# Patient Record
(demographics unavailable — no encounter records)

---

## 2024-11-19 NOTE — REVIEW OF SYSTEMS
[As Noted in HPI] : as noted in HPI [Confused or Disoriented] : confusion [Difficulty Walking] : difficulty walking [Negative] : Integumentary

## 2024-11-19 NOTE — PHYSICAL EXAM
[General Appearance - Alert] : alert [General Appearance - In No Acute Distress] : in no acute distress [Impaired Insight] : insight and judgment were intact [Motor Tone] : muscle tone was normal in all four extremities [Motor Strength] : muscle strength was normal in all four extremities [Sclera] : the sclera and conjunctiva were normal [Full Visual Field] : full visual field [Outer Ear] : the ears and nose were normal in appearance [Neck Appearance] : the appearance of the neck was normal [] : no respiratory distress [Heart Rate And Rhythm] : heart rate was normal and rhythm regular [Skin Color & Pigmentation] : normal skin color and pigmentation

## 2024-11-20 NOTE — DATA REVIEWED
[de-identified] : MR ANGIO BRAIN ORDERED BY: DHRUV ROSARIO MR BRAIN ORDERED BY: DHRUV ROSARIO  PROCEDURE DATE: 11/07/2024  INTERPRETATION: Technique: MRI of the brain was performed utilizing sagittal and axial T1, axial T2, axial gradient echo, axial and coronal FLAIR and diffusion imaging.  Comparison is made to a prior MRA of intracranial circulation performed on 5/90/24.  Findings: There is limited evaluation due to artifact from the patient's coil embolization.The patient is status post coil embolization of left ICA aneurysm. There is heterogeneous signal seen within the left lateral aneurysm sac.  There are small patchy, ovoid and punctate foci of T2 and Flair signal hyperintensities predominantly within the periventricular white matter, unchanged. There is no evidence of mass-effect or midline shift. There is no evidence of intra or extra-axial fluid collection. There is enlargement the sulci, cisterns and ventricles consistent with mild cerebral volume loss. There is prominence of the lateral ventricles, unchanged from the prior study. There is no evidence of acute infarction. Evaluation of the intracranial vascular flow-voids appear within normal limits. Gradient echo images demonstrate mild scattered bilateral frontal parietal temporal occipital subarachnoid hemosiderin staining consistent with prior subarachnoid hemorrhage There is minimal right maxillary mucosal thickening. The remaining visualized paranasal sinuses and bilateral mastoid air cells are clear. Technique: MRA of the intracranial circulation was performed using 3-D time of flight technique.  Comparison is made to a prior MRA performed on 5/90/24.  Findings: Evaluation of the anterior circulation demonstrates the patient is status post coil embolization of a large left internal carotid artery aneurysm. There has been interval increase in flow related signal within the left aneurysm sac. There is normal course and caliber of the right distal internal carotid artery. There is a normal appearance to the bilateral anterior cerebral and right middle cerebral arteries. There is limited evaluation of the left MCA due to artifact. Evaluation the posterior circulation demonstrates interval appearance of severe narrowing of the left P1 segment of the PCA that may be artifactual. Otherwise there is normal course and caliber of the bilateral vertebral arteries, vertebrobasilar junction and basilar artery. The right PCA is also within normal limits.  IMPRESSION: MRI of the brain: Status post coil embolization of left ICA aneurysm. Ventriculomegaly, unchanged . Mild microvascular disease. No evidence of acute infarction.  MRA of the intracranial circulation: Status post coil embolization of left ICA aneurysm with interval increase flow related signal within the left aneurysm sac. Interval severe stenosis of the T1 segment of the left PCA versus artifact.

## 2024-11-20 NOTE — HISTORY OF PRESENT ILLNESS
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   Patient presented to [facility] ED 2/27/24 with worsening headache. He reportedly fell while playing ping pong 4 days prior and had worsening headache since then. He takes ASA 81mg (last dose yesterday) for cardio protection. PCP ordered MRI and MRA Brain, which revealed large Left Supraclinoid ICA aneurysm, so was sent to ED. CT Head/CTA Head/Neck in the ED showed diffuse SAH, IVH, and 2.1cm L Supraclinoid aneurysm. Patient endorsed mild headache only. Denied vision changes, nausea/vomiting, weakness, numbness. He was admitted to the NSICU and on 2/28/24 the aneurysm was treated with coil embolization by Dr. Loera. Patient's hospital course was complicated by CSW and Influenza A. He was stabilized and discharged to [facility] 2/20/24.  4/11/24 - first postop visit with Dr. Loera. Unable to walk; in wheelchair. Patient complaining of confusion and memory loss. Mentation is improving. He endorses occasional Right-sided headaches 7/10 pain; has chronic blurry vision. Complaining of abd pain and constipation. Plan is to repeat MRA Head in 3 months and return to clinic. Continue working with PT  5/21/24 - f/u appt with Dr. Loera. Feeling much improved/stronger than last visit, still requiring wheelchair. Confusion and memory loss, disoriented to year oriented to self, place, and situation. Peripheral blurry vision in both eyes improved but visual field not quite full. Once patient discharged from rehab, plan for appt with LUKAS Kunz for Angiogram with possible flow diverter stent in August 2024 6/03/24 - patient discharged to home from Acute Rehab. Received 2 additional weeks of physical therapy at home  8/01/24 - f/u appt with LUKAS Kunz. Patient endorses feeling much more energetic and closer to baseline since discharge to home. Still with confusion, memory loss and leg weakness. Left leg feels stronger than Right. Peripheral blurry vision improved.  Disoriented to year still, but oriented to self, place, and situation. Patient's balance was impaired on exam, able to slowly ambulate on his own. LUE weaker than other 3 extremities. Plan to continue PT and obtain new MRA Brain in 6 months for aneurysm and MRI to r/o Stroke or new areas of bleed.   TODAY patient states he is feeling good, son states memory and cog issues remain. Strength and sensation intact x4. Still uses rolling walker for ambulation.   Soc Hx: Former smoker (quit 10+ yrs ago), no EtOH, no family history of aneurysms  PCP: Dr. Chun PFEIFFER  [facility]. [address], [place], [place] [id number] [phone]  Endo: Dr. Yennifer Mckeon [address] [place] [address] [phone]

## 2024-11-20 NOTE — ASSESSMENT
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    I reviewed and explained the results of the most recent imaging MRI and MRA Head (11/07/24) with the patient, which demonstrates no stroke, coil embolization of left ICA aneurysm with interval increase flow related signal within the left aneurysm sac compatible with continued aneurysm regrowth. Given these findings, and the history of prior rupture of this aneurysm, i discussed potential management options including continued observation vs treating with flow diversion. I discussed risks and benefits of each approach. At the end of the conversation, the patient and his son stated they wanted to proceed with treatment.   PLAN: - Flow diverting stent placement 1/08/24 - start DAPT 12/23/24; meds sent to "[facility]" - 12/27 check accumetrcis - clearance clinic info given to patient  - return to Dr. Loera clinic to review   The patient was instructed that if they should immediately call 911 or go to the Emergency Department if they experience symptoms of severe thunderclap headache, syncope, unexplained nausea/vomiting, visual changes, seizure-like activity, new weakness or numbness of extremities.   I reviewed and answered all patient questions. Patient verbalizes agreement and understanding with plan of care. Patient verbalizes agreement and understanding with plan of care.  I, Dr. Loera, personally performed the evaluation and management (E/M) services for this established patient who presents today with (a) new problem(s)/exacerbation of (an) existing condition(s). That E/M includes conducting the examination, assessing all new/exacerbated conditions, and establishing a new plan of care. Today, SALVADOR Song, was here to observe my evaluation and management services for this new problem/exacerbated condition to be followed going forward.

## 2024-11-20 NOTE — DATA REVIEWED
[de-identified] : MR ANGIO BRAIN ORDERED BY: DHRUV ROSARIO MR BRAIN ORDERED BY: DHRUV ROSARIO  PROCEDURE DATE: 11/07/2024  INTERPRETATION: Technique: MRI of the brain was performed utilizing sagittal and axial T1, axial T2, axial gradient echo, axial and coronal FLAIR and diffusion imaging.  Comparison is made to a prior MRA of intracranial circulation performed on 5/90/24.  Findings: There is limited evaluation due to artifact from the patient's coil embolization.The patient is status post coil embolization of left ICA aneurysm. There is heterogeneous signal seen within the left lateral aneurysm sac.  There are small patchy, ovoid and punctate foci of T2 and Flair signal hyperintensities predominantly within the periventricular white matter, unchanged. There is no evidence of mass-effect or midline shift. There is no evidence of intra or extra-axial fluid collection. There is enlargement the sulci, cisterns and ventricles consistent with mild cerebral volume loss. There is prominence of the lateral ventricles, unchanged from the prior study. There is no evidence of acute infarction. Evaluation of the intracranial vascular flow-voids appear within normal limits. Gradient echo images demonstrate mild scattered bilateral frontal parietal temporal occipital subarachnoid hemosiderin staining consistent with prior subarachnoid hemorrhage There is minimal right maxillary mucosal thickening. The remaining visualized paranasal sinuses and bilateral mastoid air cells are clear. Technique: MRA of the intracranial circulation was performed using 3-D time of flight technique.  Comparison is made to a prior MRA performed on 5/90/24.  Findings: Evaluation of the anterior circulation demonstrates the patient is status post coil embolization of a large left internal carotid artery aneurysm. There has been interval increase in flow related signal within the left aneurysm sac. There is normal course and caliber of the right distal internal carotid artery. There is a normal appearance to the bilateral anterior cerebral and right middle cerebral arteries. There is limited evaluation of the left MCA due to artifact. Evaluation the posterior circulation demonstrates interval appearance of severe narrowing of the left P1 segment of the PCA that may be artifactual. Otherwise there is normal course and caliber of the bilateral vertebral arteries, vertebrobasilar junction and basilar artery. The right PCA is also within normal limits.  IMPRESSION: MRI of the brain: Status post coil embolization of left ICA aneurysm. Ventriculomegaly, unchanged . Mild microvascular disease. No evidence of acute infarction.  MRA of the intracranial circulation: Status post coil embolization of left ICA aneurysm with interval increase flow related signal within the left aneurysm sac. Interval severe stenosis of the T1 segment of the left PCA versus artifact.

## 2024-11-20 NOTE — ASSESSMENT
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    I reviewed and explained the results of the most recent imaging MRI and MRA Head (11/07/24) with the patient, which demonstrates no stroke, coil embolization of left ICA aneurysm with interval increase flow related signal within the left aneurysm sac compatible with continued aneurysm regrowth. Given these findings, and the history of prior rupture of this aneurysm, i discussed potential management options including continued observation vs treating with flow diversion. I discussed risks and benefits of each approach. At the end of the conversation, the patient and his son stated they wanted to proceed with treatment.   PLAN: - Flow diverting stent placement 1/08/24 - start DAPT 12/23/24; meds sent to [facility] - 12/27 check accumetrcis - clearance clinic info given to patient  - return to Dr. Loera clinic to review   The patient was instructed that if they should immediately call 911 or go to the Emergency Department if they experience symptoms of severe thunderclap headache, syncope, unexplained nausea/vomiting, visual changes, seizure-like activity, new weakness or numbness of extremities.   I reviewed and answered all patient questions. Patient verbalizes agreement and understanding with plan of care. Patient verbalizes agreement and understanding with plan of care.  I, Dr. Loera, personally performed the evaluation and management (E/M) services for this established patient who presents today with (a) new problem(s)/exacerbation of (an) existing condition(s). That E/M includes conducting the examination, assessing all new/exacerbated conditions, and establishing a new plan of care. Today, SALVADOR Song, was here to observe my evaluation and management services for this new problem/exacerbated condition to be followed going forward.

## 2024-11-20 NOTE — REASON FOR VISIT
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

## 2024-11-20 NOTE — HISTORY OF PRESENT ILLNESS
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   Patient presented to [facility] ED 2/27/24 with worsening headache. He reportedly fell while playing ping pong 4 days prior and had worsening headache since then. He takes ASA 81mg (last dose yesterday) for cardio protection. PCP ordered MRI and MRA Brain, which revealed large Left Supraclinoid ICA aneurysm, so was sent to ED. CT Head/CTA Head/Neck in the ED showed diffuse SAH, IVH, and 2.1cm L Supraclinoid aneurysm. Patient endorsed mild headache only. Denied vision changes, nausea/vomiting, weakness, numbness. He was admitted to the NSICU and on 2/28/24 the aneurysm was treated with coil embolization by Dr. Loera. Patient's hospital course was complicated by CSW and Influenza A. He was stabilized and discharged to [facility] 2/20/24.  4/11/24 - first postop visit with Dr. Leora. Unable to walk; in wheelchair. Patient complaining of confusion and memory loss. Mentation is improving. He endorses occasional Right-sided headaches 7/10 pain; has chronic blurry vision. Complaining of abd pain and constipation. Plan is to repeat MRA Head in 3 months and return to clinic. Continue working with PT  5/21/24 - f/u appt with Dr. Loera. Feeling much improved/stronger than last visit, still requiring wheelchair. Confusion and memory loss, disoriented to year oriented to self, place, and situation. Peripheral blurry vision in both eyes improved but visual field not quite full. Once patient discharged from rehab, plan for appt with LUKAS Kunz for Angiogram with possible flow diverter stent in August 2024 6/03/24 - patient discharged to home from Acute Rehab. Received 2 additional weeks of physical therapy at home  8/01/24 - f/u appt with LUKAS Kunz. Patient endorses feeling much more energetic and closer to baseline since discharge to home. Still with confusion, memory loss and leg weakness. Left leg feels stronger than Right. Peripheral blurry vision improved.  Disoriented to year still, but oriented to self, place, and situation. Patient's balance was impaired on exam, able to slowly ambulate on his own. LUE weaker than other 3 extremities. Plan to continue PT and obtain new MRA Brain in 6 months for aneurysm and MRI to r/o Stroke or new areas of bleed.   TODAY patient states he is feeling good, son states memory and cog issues remain. Strength and sensation intact x4. Still uses rolling walker for ambulation.   Soc Hx: Former smoker (quit 10+ yrs ago), no EtOH, no family history of aneurysms  PCP: Dr. Chun PFEIFFER  [facility]. [address], [place], [place] [id number] [phone]  Endo: Dr. Yennifer Mckeon [address] [place] [address] [phone]

## 2024-11-20 NOTE — ASSESSMENT
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    I reviewed and explained the results of the most recent imaging MRI and MRA Head (11/07/24) with the patient, which demonstrates no stroke, coil embolization of left ICA aneurysm with interval increase flow related signal within the left aneurysm sac compatible with continued aneurysm regrowth. Given these findings, and the history of prior rupture of this aneurysm, i discussed potential management options including continued observation vs treating with flow diversion. I discussed risks and benefits of each approach. At the end of the conversation, the patient and his son stated they wanted to proceed with treatment.   PLAN: - Flow diverting stent placement 1/08/24 - start DAPT 12/23/24; meds sent to [employer] - 12/27 check accumetrcis - clearance clinic info given to patient  - return to Dr. Loera clinic to review   The patient was instructed that if they should immediately call 911 or go to the Emergency Department if they experience symptoms of severe thunderclap headache, syncope, unexplained nausea/vomiting, visual changes, seizure-like activity, new weakness or numbness of extremities.   I reviewed and answered all patient questions. Patient verbalizes agreement and understanding with plan of care. Patient verbalizes agreement and understanding with plan of care.  I, Dr. Loera, personally performed the evaluation and management (E/M) services for this established patient who presents today with (a) new problem(s)/exacerbation of (an) existing condition(s). That E/M includes conducting the examination, assessing all new/exacerbated conditions, and establishing a new plan of care. Today, SALVADOR Song, was here to observe my evaluation and management services for this new problem/exacerbated condition to be followed going forward.

## 2024-11-20 NOTE — DATA REVIEWED
[de-identified] : MR ANGIO BRAIN ORDERED BY: DHRUV ROSARIO MR BRAIN ORDERED BY: DHRUV ROSARIO  PROCEDURE DATE: 11/07/2024  INTERPRETATION: Technique: MRI of the brain was performed utilizing sagittal and axial T1, axial T2, axial gradient echo, axial and coronal FLAIR and diffusion imaging.  Comparison is made to a prior MRA of intracranial circulation performed on 5/90/24.  Findings: There is limited evaluation due to artifact from the patient's coil embolization.The patient is status post coil embolization of left ICA aneurysm. There is heterogeneous signal seen within the left lateral aneurysm sac.  There are small patchy, ovoid and punctate foci of T2 and Flair signal hyperintensities predominantly within the periventricular white matter, unchanged. There is no evidence of mass-effect or midline shift. There is no evidence of intra or extra-axial fluid collection. There is enlargement the sulci, cisterns and ventricles consistent with mild cerebral volume loss. There is prominence of the lateral ventricles, unchanged from the prior study. There is no evidence of acute infarction. Evaluation of the intracranial vascular flow-voids appear within normal limits. Gradient echo images demonstrate mild scattered bilateral frontal parietal temporal occipital subarachnoid hemosiderin staining consistent with prior subarachnoid hemorrhage There is minimal right maxillary mucosal thickening. The remaining visualized paranasal sinuses and bilateral mastoid air cells are clear. Technique: MRA of the intracranial circulation was performed using 3-D time of flight technique.  Comparison is made to a prior MRA performed on 5/90/24.  Findings: Evaluation of the anterior circulation demonstrates the patient is status post coil embolization of a large left internal carotid artery aneurysm. There has been interval increase in flow related signal within the left aneurysm sac. There is normal course and caliber of the right distal internal carotid artery. There is a normal appearance to the bilateral anterior cerebral and right middle cerebral arteries. There is limited evaluation of the left MCA due to artifact. Evaluation the posterior circulation demonstrates interval appearance of severe narrowing of the left P1 segment of the PCA that may be artifactual. Otherwise there is normal course and caliber of the bilateral vertebral arteries, vertebrobasilar junction and basilar artery. The right PCA is also within normal limits.  IMPRESSION: MRI of the brain: Status post coil embolization of left ICA aneurysm. Ventriculomegaly, unchanged . Mild microvascular disease. No evidence of acute infarction.  MRA of the intracranial circulation: Status post coil embolization of left ICA aneurysm with interval increase flow related signal within the left aneurysm sac. Interval severe stenosis of the T1 segment of the left PCA versus artifact.

## 2024-11-20 NOTE — ASSESSMENT
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    I reviewed and explained the results of the most recent imaging MRI and MRA Head (11/07/24) with the patient, which demonstrates no stroke, coil embolization of left ICA aneurysm with interval increase flow related signal within the left aneurysm sac compatible with continued aneurysm regrowth. Given these findings, and the history of prior rupture of this aneurysm, i discussed potential management options including continued observation vs treating with flow diversion. I discussed risks and benefits of each approach. At the end of the conversation, the patient and his son stated they wanted to proceed with treatment.   PLAN: - Flow diverting stent placement 1/08/24 - start DAPT 12/23/24; meds sent to [facility] - 12/27 check accumetrcis - clearance clinic info given to patient  - return to Dr. Loera clinic to review   The patient was instructed that if they should immediately call 911 or go to the Emergency Department if they experience symptoms of severe thunderclap headache, syncope, unexplained nausea/vomiting, visual changes, seizure-like activity, new weakness or numbness of extremities.   I reviewed and answered all patient questions. Patient verbalizes agreement and understanding with plan of care. Patient verbalizes agreement and understanding with plan of care.  I, Dr. Loera, personally performed the evaluation and management (E/M) services for this established patient who presents today with (a) new problem(s)/exacerbation of (an) existing condition(s). That E/M includes conducting the examination, assessing all new/exacerbated conditions, and establishing a new plan of care. Today, SALVADOR Song, was here to observe my evaluation and management services for this new problem/exacerbated condition to be followed going forward.

## 2024-11-20 NOTE — HISTORY OF PRESENT ILLNESS
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   Patient presented to [facility] ED 2/27/24 with worsening headache. He reportedly fell while playing ping pong 4 days prior and had worsening headache since then. He takes ASA 81mg (last dose yesterday) for cardio protection. PCP ordered MRI and MRA Brain, which revealed large Left Supraclinoid ICA aneurysm, so was sent to ED. CT Head/CTA Head/Neck in the ED showed diffuse SAH, IVH, and 2.1cm L Supraclinoid aneurysm. Patient endorsed mild headache only. Denied vision changes, nausea/vomiting, weakness, numbness. He was admitted to the NSICU and on 2/28/24 the aneurysm was treated with coil embolization by Dr. Loera. Patient's hospital course was complicated by CSW and Influenza A. He was stabilized and discharged to [facility] 2/20/24.  4/11/24 - first postop visit with Dr. Loera. Unable to walk; in wheelchair. Patient complaining of confusion and memory loss. Mentation is improving. He endorses occasional Right-sided headaches 7/10 pain; has chronic blurry vision. Complaining of abd pain and constipation. Plan is to repeat MRA Head in 3 months and return to clinic. Continue working with PT  5/21/24 - f/u appt with Dr. Loera. Feeling much improved/stronger than last visit, still requiring wheelchair. Confusion and memory loss, disoriented to year oriented to self, place, and situation. Peripheral blurry vision in both eyes improved but visual field not quite full. Once patient discharged from rehab, plan for appt with LUKAS Kunz for Angiogram with possible flow diverter stent in August 2024 6/03/24 - patient discharged to home from Acute Rehab. Received 2 additional weeks of physical therapy at home  8/01/24 - f/u appt with LUKAS Kunz. Patient endorses feeling much more energetic and closer to baseline since discharge to home. Still with confusion, memory loss and leg weakness. Left leg feels stronger than Right. Peripheral blurry vision improved.  Disoriented to year still, but oriented to self, place, and situation. Patient's balance was impaired on exam, able to slowly ambulate on his own. LUE weaker than other 3 extremities. Plan to continue PT and obtain new MRA Brain in 6 months for aneurysm and MRI to r/o Stroke or new areas of bleed.   TODAY patient states he is feeling good, son states memory and cog issues remain. Strength and sensation intact x4. Still uses rolling walker for ambulation.   Soc Hx: Former smoker (quit 10+ yrs ago), no EtOH, no family history of aneurysms  PCP: Dr. Chnu PFEIFFER  [facility]. [address], [place], [place] [id number] [phone]  Endo: Dr. Yennifer Mckeon [address] [place] [address] [phone]

## 2024-11-20 NOTE — HISTORY OF PRESENT ILLNESS
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   Patient presented to [facility] ED 2/27/24 with worsening headache. He reportedly fell while playing ping pong 4 days prior and had worsening headache since then. He takes ASA 81mg (last dose yesterday) for cardio protection. PCP ordered MRI and MRA Brain, which revealed large Left Supraclinoid ICA aneurysm, so was sent to ED. CT Head/CTA Head/Neck in the ED showed diffuse SAH, IVH, and 2.1cm L Supraclinoid aneurysm. Patient endorsed mild headache only. Denied vision changes, nausea/vomiting, weakness, numbness. He was admitted to the NSICU and on 2/28/24 the aneurysm was treated with coil embolization by Dr. Loera. Patient's hospital course was complicated by CSW and Influenza A. He was stabilized and discharged to [facility] Acute [facility]hab 2/20/24.  4/11/24 - first postop visit with Dr. Loera. Unable to walk; in wheelchair. Patient complaining of confusion and memory loss. Mentation is improving. He endorses occasional Right-sided headaches 7/10 pain; has chronic blurry vision. Complaining of abd pain and constipation. Plan is to repeat MRA Head in 3 months and return to clinic. Continue working with PT  5/21/24 - f/u appt with Dr. Loera. Feeling much improved/stronger than last visit, still requiring wheelchair. Confusion and memory loss, disoriented to year oriented to self, place, and situation. Peripheral blurry vision in both eyes improved but visual field not quite full. Once patient discharged from rehab, plan for appt with LUKAS Kunz for Angiogram with possible flow diverter stent in August 2024 6/03/24 - patient discharged to home from Acute Rehab. Received 2 additional weeks of physical therapy at home  8/01/24 - f/u appt with LUKAS Kunz. Patient endorses feeling much more energetic and closer to baseline since discharge to home. Still with confusion, memory loss and leg weakness. Left leg feels stronger than Right. Peripheral blurry vision improved.  Disoriented to year still, but oriented to self, place, and situation. Patient's balance was impaired on exam, able to slowly ambulate on his own. LUE weaker than other 3 extremities. Plan to continue PT and obtain new MRA Brain in 6 months for aneurysm and MRI to r/o Stroke or new areas of bleed.   TODAY patient states he is feeling good, son states memory and cog issues remain. Strength and sensation intact x4. Still uses rolling walker for ambulation.   Soc Hx: Former smoker (quit 10+ yrs ago), no EtOH, no family history of aneurysms  PCP: Dr. Chun PFEIFFER  [facility]. [id number], [place], [place] [id number] [phone]  Endo: Dr. Yennifer Mckeon [address] [place] [address] [phone]

## 2024-11-20 NOTE — DATA REVIEWED
[de-identified] : MR ANGIO BRAIN ORDERED BY: DHRUV ROASRIO MR BRAIN ORDERED BY: DHRUV ROSARIO  PROCEDURE DATE: 11/07/2024  INTERPRETATION: Technique: MRI of the brain was performed utilizing sagittal and axial T1, axial T2, axial gradient echo, axial and coronal FLAIR and diffusion imaging.  Comparison is made to a prior MRA of intracranial circulation performed on 5/90/24.  Findings: There is limited evaluation due to artifact from the patient's coil embolization.The patient is status post coil embolization of left ICA aneurysm. There is heterogeneous signal seen within the left lateral aneurysm sac.  There are small patchy, ovoid and punctate foci of T2 and Flair signal hyperintensities predominantly within the periventricular white matter, unchanged. There is no evidence of mass-effect or midline shift. There is no evidence of intra or extra-axial fluid collection. There is enlargement the sulci, cisterns and ventricles consistent with mild cerebral volume loss. There is prominence of the lateral ventricles, unchanged from the prior study. There is no evidence of acute infarction. Evaluation of the intracranial vascular flow-voids appear within normal limits. Gradient echo images demonstrate mild scattered bilateral frontal parietal temporal occipital subarachnoid hemosiderin staining consistent with prior subarachnoid hemorrhage There is minimal right maxillary mucosal thickening. The remaining visualized paranasal sinuses and bilateral mastoid air cells are clear. Technique: MRA of the intracranial circulation was performed using 3-D time of flight technique.  Comparison is made to a prior MRA performed on 5/90/24.  Findings: Evaluation of the anterior circulation demonstrates the patient is status post coil embolization of a large left internal carotid artery aneurysm. There has been interval increase in flow related signal within the left aneurysm sac. There is normal course and caliber of the right distal internal carotid artery. There is a normal appearance to the bilateral anterior cerebral and right middle cerebral arteries. There is limited evaluation of the left MCA due to artifact. Evaluation the posterior circulation demonstrates interval appearance of severe narrowing of the left P1 segment of the PCA that may be artifactual. Otherwise there is normal course and caliber of the bilateral vertebral arteries, vertebrobasilar junction and basilar artery. The right PCA is also within normal limits.  IMPRESSION: MRI of the brain: Status post coil embolization of left ICA aneurysm. Ventriculomegaly, unchanged . Mild microvascular disease. No evidence of acute infarction.  MRA of the intracranial circulation: Status post coil embolization of left ICA aneurysm with interval increase flow related signal within the left aneurysm sac. Interval severe stenosis of the T1 segment of the left PCA versus artifact.

## 2025-01-02 NOTE — HISTORY OF PRESENT ILLNESS
[No Pertinent Cardiac History] : no history of aortic stenosis, atrial fibrillation, coronary artery disease, recent myocardial infarction, or implantable device/pacemaker [No Pertinent Pulmonary History] : no history of asthma, COPD, sleep apnea, or smoking [No Adverse Anesthesia Reaction] : no adverse anesthesia reaction in self or family member [Diabetes] : diabetes [(Patient denies any chest pain, claudication, dyspnea on exertion, orthopnea, palpitations or syncope)] : Patient denies any chest pain, claudication, dyspnea on exertion, orthopnea, palpitations or syncope [Moderate (4-6 METs)] : Moderate (4-6 METs) [Family Member] : family member [Chronic Anticoagulation] : no chronic anticoagulation [Chronic Kidney Disease] : no chronic kidney disease [FreeTextEntry1] : Flow diverting stent placement  [FreeTextEntry2] : 1/8/24 [FreeTextEntry3] : Dr. Loera [FreeTextEntry4] : Mr. 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 Biref clinical course, patient had a fall on 02/2024 w/ worsening HA, had an MRI and MRA brain which showed a large left supraclinoid ICA aneurysm and s/p coil embolization. Discharged to rehab w/ NSGY follow. Patient's recovery overall well. Had a routine MRI and MRA brain which showed enlarging aneurysm and is not planned for diverting stent on 1/8/24. Does report baseline HA 5/10 but ROS otherwise negative. Feels well otherwise.   Saw endocrinologist, last A1c was 10% ~1month ago. Reports endocrinogist is okay with patient having this procedure. Patient has a note by his endocrinologist with recommendations prior to surgery    Meds: Basaglar 24u qhs, Metformin 850mg BID, Glipizide 10mg qd, Actos 30mg qd, Januvia 100mg qd, Jardiance 25mg qd, Prandin 2mg qd, Atorvastatin 10mg qd, plavix 75mg, and ASA 81mg  NKDA Surg: Coil embolization 2/28/24 FamHx: Father and mother hx of DM SocHx: Ex-smoker quit 10yrs (smoked 1 ppd for 8yrs, quit >10ys ago), No alcohol use, No recreational drug use, Returied (worked in clothing). [FreeTextEntry7] : TTE 2/28/2024 CONCLUSIONS: 1. Normal left and right ventricular size and systolic function. 2. Mild symmetric left ventricular hypertrophy. 3. Mild mitral regurgitation. 4. No evidence of pulmonary hypertension. 5. No pericardial effusion. [FreeTextEntry8] : METS hard to fully access but patient walks with a walker outside. Does walk ~10mins at home before having leg pain but no exertional CP or exertional SOB. has a bike pedal at home which he bikes 5mins 2-3x a day. Able to feed and clean himself. Has a HHA which helps w/ household chores for 6hrs aday.

## 2025-01-02 NOTE — ADDENDUM
[FreeTextEntry1] : labs notable for known uncontrolled diabetes and borderline prolonged PTT. Mixing study added. Not a contraindication to low risk procedure (angiogram with stent placement).

## 2025-01-02 NOTE — REVIEW OF SYSTEMS
[Negative] : Musculoskeletal [Fever] : no fever [Chills] : no chills [Fatigue] : no fatigue [Night Sweats] : no night sweats [Discharge] : no discharge [Pain] : no pain [Vision Problems] : no vision problems [Nasal Discharge] : no nasal discharge [Sore Throat] : no sore throat [Hoarseness] : no hoarseness [Skin Rash] : no skin rash [de-identified] : +HA

## 2025-01-02 NOTE — HISTORY OF PRESENT ILLNESS
[No Pertinent Cardiac History] : no history of aortic stenosis, atrial fibrillation, coronary artery disease, recent myocardial infarction, or implantable device/pacemaker [No Pertinent Pulmonary History] : no history of asthma, COPD, sleep apnea, or smoking [No Adverse Anesthesia Reaction] : no adverse anesthesia reaction in self or family member [Diabetes] : diabetes [(Patient denies any chest pain, claudication, dyspnea on exertion, orthopnea, palpitations or syncope)] : Patient denies any chest pain, claudication, dyspnea on exertion, orthopnea, palpitations or syncope [Moderate (4-6 METs)] : Moderate (4-6 METs) [Family Member] : family member [Chronic Anticoagulation] : no chronic anticoagulation [Chronic Kidney Disease] : no chronic kidney disease [FreeTextEntry1] : Flow diverting stent placement  [FreeTextEntry2] : 1/8/24 [FreeTextEntry3] : Dr. Loera [FreeTextEntry4] : Mr. RONEL VALERA is a 78 year old male with history of HTN, HLD, DM, SAH/IVH 2/2 ruptured Left ICA aneurysm s/p coil embolization presenting today to the [facility] prior to flow diverting stent placement. Biref clinical course, patient had a fall on 02/2024 w/ worsening HA, had an MRI and MRA brain which showed a large left supraclinoid ICA aneurysm and s/p coil embolization. Discharged to rehab w/ NSGY follow. Patient's recovery overall well. Had a routine MRI and MRA brain which showed enlarging aneurysm and is not planned for diverting stent on 1/8/24. Does report baseline HA 5/10 but ROS otherwise negative. Feels well otherwise.   Saw endocrinologist, last A1c was 10% ~1month ago. Reports endocrinogist is okay with patient having this procedure. Patient has a note by his endocrinologist with recommendations prior to surgery    Meds: Basaglar 24u qhs, Metformin 850mg BID, Glipizide 10mg qd, Actos 30mg qd, Januvia 100mg qd, Jardiance 25mg qd, Prandin 2mg qd, Atorvastatin 10mg qd, plavix 75mg, and ASA 81mg  NKDA Surg: Coil embolization 2/28/24 FamHx: Father and mother hx of DM SocHx: Ex-smoker quit 10yrs (smoked 1 ppd for 8yrs, quit >10ys ago), No alcohol use, No recreational drug use, Returied (worked in clothing). [FreeTextEntry7] : TTE 2/28/2024 CONCLUSIONS: 1. Normal left and right ventricular size and systolic function. 2. Mild symmetric left ventricular hypertrophy. 3. Mild mitral regurgitation. 4. No evidence of pulmonary hypertension. 5. No pericardial effusion. [FreeTextEntry8] : METS hard to fully access but patient walks with a walker outside. Does walk ~10mins at home before having leg pain but no exertional CP or exertional SOB. has a bike pedal at home which he bikes 5mins 2-3x a day. Able to feed and clean himself. Has a HHA which helps w/ household chores for 6hrs aday.

## 2025-01-02 NOTE — REVIEW OF SYSTEMS
[Negative] : Musculoskeletal [Fever] : no fever [Chills] : no chills [Fatigue] : no fatigue [Night Sweats] : no night sweats [Discharge] : no discharge [Pain] : no pain [Vision Problems] : no vision problems [Nasal Discharge] : no nasal discharge [Sore Throat] : no sore throat [Hoarseness] : no hoarseness [Skin Rash] : no skin rash [de-identified] : +HA

## 2025-01-02 NOTE — HISTORY OF PRESENT ILLNESS
[No Pertinent Cardiac History] : no history of aortic stenosis, atrial fibrillation, coronary artery disease, recent myocardial infarction, or implantable device/pacemaker [No Pertinent Pulmonary History] : no history of asthma, COPD, sleep apnea, or smoking [No Adverse Anesthesia Reaction] : no adverse anesthesia reaction in self or family member [Diabetes] : diabetes [(Patient denies any chest pain, claudication, dyspnea on exertion, orthopnea, palpitations or syncope)] : Patient denies any chest pain, claudication, dyspnea on exertion, orthopnea, palpitations or syncope [Moderate (4-6 METs)] : Moderate (4-6 METs) [Family Member] : family member [Chronic Anticoagulation] : no chronic anticoagulation [Chronic Kidney Disease] : no chronic kidney disease [FreeTextEntry1] : Flow diverting stent placement  [FreeTextEntry2] : 1/8/24 [FreeTextEntry3] : Dr. Loera [FreeTextEntry4] : Mr. RONEL VALERA is a 78 year old male with history of HTN, HLD, DM, SAH/IVH 2/2 ruptured Left ICA aneurysm s/p coil embolization presenting today to the [facility] prior to flow diverting stent placement. Biref clinical course, patient had a fall on 02/2024 w/ worsening HA, had an MRI and MRA brain which showed a large left supraclinoid ICA aneurysm and s/p coil embolization. Discharged to rehab w/ NSGY follow. Patient's recovery overall well. Had a routine MRI and MRA brain which showed enlarging aneurysm and is not planned for diverting stent on 1/8/24. Does report baseline HA 5/10 but ROS otherwise negative. Feels well otherwise.   Saw endocrinologist, last A1c was 10% ~1month ago. Reports endocrinogist is okay with patient having this procedure. Patient has a note by his endocrinologist with recommendations prior to surgery    Meds: Basaglar 24u qhs, Metformin 850mg BID, Glipizide 10mg qd, Actos 30mg qd, Januvia 100mg qd, Jardiance 25mg qd, Prandin 2mg qd, Atorvastatin 10mg qd, plavix 75mg, and ASA 81mg  NKDA Surg: Coil embolization 2/28/24 FamHx: Father and mother hx of DM SocHx: Ex-smoker quit 10yrs (smoked 1 ppd for 8yrs, quit >10ys ago), No alcohol use, No recreational drug use, Returied (worked in clothing). [FreeTextEntry7] : TTE 2/28/2024 CONCLUSIONS: 1. Normal left and right ventricular size and systolic function. 2. Mild symmetric left ventricular hypertrophy. 3. Mild mitral regurgitation. 4. No evidence of pulmonary hypertension. 5. No pericardial effusion. [FreeTextEntry8] : METS hard to fully access but patient walks with a walker outside. Does walk ~10mins at home before having leg pain but no exertional CP or exertional SOB. has a bike pedal at home which he bikes 5mins 2-3x a day. Able to feed and clean himself. Has a HHA which helps w/ household chores for 6hrs aday.

## 2025-01-02 NOTE — PHYSICAL EXAM
[Normal Sclera/Conjunctiva] : normal sclera/conjunctiva [PERRL] : pupils equal round and reactive to light [EOMI] : extraocular movements intact [No Lymphadenopathy] : no lymphadenopathy [Supple] : supple [No Varicosities] : no varicosities [No Edema] : there was no peripheral edema [Soft] : abdomen soft [Non Tender] : non-tender [Normal Bowel Sounds] : normal bowel sounds [No CVA Tenderness] : no CVA  tenderness [No Spinal Tenderness] : no spinal tenderness [Normal] : normal gait, coordination grossly intact, no focal deficits and deep tendon reflexes were 2+ and symmetric [de-identified] : Abd distended.

## 2025-01-02 NOTE — END OF VISIT
[] : Resident [FreeTextEntry3] : Patients declines  and requests his son translate. Uncontrolled T2DM but would not delay time sensitive procedure for further optimization. Pre-op medication management discussed in detail with son and patient. They have written directions from neurosurgery. No further cardiac testing need for low-risk procedure, no anginal symptoms, ECG without ischemic changes. Intermediate risk for low risk procedure pending labs.

## 2025-01-02 NOTE — PHYSICAL EXAM
Detail Level: Detailed Detail Level: Generalized Patient Specific Counseling (Will Not Stick From Patient To Patient): Less then one percent BSA, Patient can not take methotrexate due to being a social drinker. Discussed in detail RBO’s of oral medications, steroids topicals, uvb treatments. Patient did treat with uvb treatment in the past with little response. Patient also stated that she works full time and can not work into her schedule to come into the office two - three times a week. Significant improvement noted today while treating with Taltz, flares present on the left elbow. [Normal Sclera/Conjunctiva] : normal sclera/conjunctiva [PERRL] : pupils equal round and reactive to light [EOMI] : extraocular movements intact [No Lymphadenopathy] : no lymphadenopathy [Supple] : supple [No Varicosities] : no varicosities [No Edema] : there was no peripheral edema [Soft] : abdomen soft [Non Tender] : non-tender [Normal Bowel Sounds] : normal bowel sounds [No CVA Tenderness] : no CVA  tenderness [No Spinal Tenderness] : no spinal tenderness [Normal] : normal gait, coordination grossly intact, no focal deficits and deep tendon reflexes were 2+ and symmetric [de-identified] : Abd distended.

## 2025-01-02 NOTE — HISTORY OF PRESENT ILLNESS
[No Pertinent Cardiac History] : no history of aortic stenosis, atrial fibrillation, coronary artery disease, recent myocardial infarction, or implantable device/pacemaker [No Pertinent Pulmonary History] : no history of asthma, COPD, sleep apnea, or smoking [No Adverse Anesthesia Reaction] : no adverse anesthesia reaction in self or family member [Diabetes] : diabetes [(Patient denies any chest pain, claudication, dyspnea on exertion, orthopnea, palpitations or syncope)] : Patient denies any chest pain, claudication, dyspnea on exertion, orthopnea, palpitations or syncope [Moderate (4-6 METs)] : Moderate (4-6 METs) [Family Member] : family member [Chronic Anticoagulation] : no chronic anticoagulation [Chronic Kidney Disease] : no chronic kidney disease [FreeTextEntry1] : Flow diverting stent placement  [FreeTextEntry2] : 1/8/24 [FreeTextEntry3] : Dr. Loera [FreeTextEntry4] : Mr. RONEL VALERA is a 78 year old male with history of HTN, HLD, DM, SAH/IVH 2/2 ruptured Left ICA aneurysm s/p coil embolization presenting today to the [facility] prior to flow diverting stent placement. Biref clinical course, patient had a fall on 02/2024 w/ worsening HA, had an MRI and MRA brain which showed a large left supraclinoid ICA aneurysm and s/p coil embolization. Discharged to rehab w/ NSGY follow. Patient's recovery overall well. Had a routine MRI and MRA brain which showed enlarging aneurysm and is not planned for diverting stent on 1/8/24. Does report baseline HA 5/10 but ROS otherwise negative. Feels well otherwise.   Saw endocrinologist, last A1c was 10% ~1month ago. Reports endocrinogist is okay with patient having this procedure. Patient has a note by his endocrinologist with recommendations prior to surgery    Meds: Basaglar 24u qhs, Metformin 850mg BID, Glipizide 10mg qd, Actos 30mg qd, Januvia 100mg qd, Jardiance 25mg qd, Prandin 2mg qd, Atorvastatin 10mg qd, plavix 75mg, and ASA 81mg  NKDA Surg: Coil embolization 2/28/24 FamHx: Father and mother hx of DM SocHx: Ex-smoker quit 10yrs (smoked 1 ppd for 8yrs, quit >10ys ago), No alcohol use, No recreational drug use, Returied (worked in clothing). [FreeTextEntry7] : TTE 2/28/2024 CONCLUSIONS: 1. Normal left and right ventricular size and systolic function. 2. Mild symmetric left ventricular hypertrophy. 3. Mild mitral regurgitation. 4. No evidence of pulmonary hypertension. 5. No pericardial effusion. [FreeTextEntry8] : METS hard to fully access but patient walks with a walker outside. Does walk ~10mins at home before having leg pain but no exertional CP or exertional SOB. has a bike pedal at home which he bikes 5mins 2-3x a day. Able to feed and clean himself. Has a HHA which helps w/ household chores for 6hrs aday.

## 2025-01-02 NOTE — INTERPRETER SERVICES
Discharge Instructions after Colonoscopy  or Sigmoidoscopy    Today you had a __x__ Colonoscopy     Activity and Diet  You were given medicine for pain. You may be dizzy or sleepy.  For 24 hours:    Do not drive or use heavy equipment.    Do not make important decisions.    Do not drink any alcohol.  You may return to your normal diet and medicines.    Discomfort    Air was placed in your colon during the exam in order to see it. Walking helps to pass the air.    You may take Tylenol (acetaminophen) for pain unless your doctor has told you not to.    Follow-up  ___x_ We took small tissue samples or polyps to study. Your doctor will call you with the results  within two weeks.    When to call:    Call right away if you have:    Unusual pain in belly or chest pain not relieved with passing air.    More than 1 to 2 Tablespoons of bleeding from your rectum.    Fever above 100.6  F (37.5  C).    If you have severe pain, bleeding, or shortness of breath, go to an emergency room.    If you have questions, call:  Monday to Friday, 7 a.m. to 4:30 p.m.  Endoscopy: [phone] (We may have to call you back)    After hours  Hospital: [phone] (Ask for the GI fellow on call)Discharge Instructions after  Upper Endoscopy (EGD)    Activity and Diet  You were given medicine for pain. You may be dizzy or sleepy.  For 24 hours:    Do not drive or use heavy equipment.    Do not make important decisions.    Do not drink any alcohol.  _x__ You may return to your regular diet.    Discomfort  You may have a sore throat for 2 to 3 days. It may help to:    Avoid hot liquids for 24 hours.    Use sore throat lozenges.    Gargle as needed with salt water up to 4 times a day. Mix 1 cup of warm water  with 1 teaspoon of salt. Do not swallow..    You may take Tylenol (acetaminophen) for pain unless your doctor has told you not to.      Follow-up  ___ We took small tissue samples for study. If you do not have a follow-up visit scheduled,  call  your provider s office in 2 weeks for the results.    Other instructions________________________________________________________    When to call us:  Problems are rare. Call right away if you have:    Unusual throat pain or trouble swallowing    Unusual pain in belly or chest that is not relieved by belching or passing air    Black stools (tar-like looking bowel movement)    Temperature above 100.6  F. (37.5  C).    If you vomit blood or have severe pain, go to an emergency room.    If you have questions, call:  Monday to Friday, 7 a.m. to 4:30 p.m.: Endoscopy: [phone] (We may have to call you back)    After hours: Hospital: [phone] (Ask for the GI fellow on call)   [Ad Hoc ] : provided by an ad hoc  [Interpreters_FullName] : Nura [Interpreters_Relationshiptopatient] : Son [TWNoteComboBox1] : Chinese

## 2025-01-02 NOTE — REVIEW OF SYSTEMS
[Negative] : Musculoskeletal [Fever] : no fever [Chills] : no chills [Fatigue] : no fatigue [Night Sweats] : no night sweats [Discharge] : no discharge [Pain] : no pain [Vision Problems] : no vision problems [Nasal Discharge] : no nasal discharge [Sore Throat] : no sore throat [Hoarseness] : no hoarseness [Skin Rash] : no skin rash [de-identified] : +HA

## 2025-01-02 NOTE — INTERPRETER SERVICES
[Ad Hoc ] : provided by an ad hoc  [Interpreters_FullName] : Nura [Interpreters_Relationshiptopatient] : Son [TWNoteComboBox1] : Chinese

## 2025-01-02 NOTE — PHYSICAL EXAM
[Normal Sclera/Conjunctiva] : normal sclera/conjunctiva [PERRL] : pupils equal round and reactive to light [EOMI] : extraocular movements intact [No Lymphadenopathy] : no lymphadenopathy [Supple] : supple [No Varicosities] : no varicosities [No Edema] : there was no peripheral edema [Soft] : abdomen soft [Non Tender] : non-tender [Normal Bowel Sounds] : normal bowel sounds [No CVA Tenderness] : no CVA  tenderness [No Spinal Tenderness] : no spinal tenderness [Normal] : normal gait, coordination grossly intact, no focal deficits and deep tendon reflexes were 2+ and symmetric [de-identified] : Abd distended.

## 2025-01-02 NOTE — PHYSICAL EXAM
[Normal Sclera/Conjunctiva] : normal sclera/conjunctiva [PERRL] : pupils equal round and reactive to light [EOMI] : extraocular movements intact [No Lymphadenopathy] : no lymphadenopathy [Supple] : supple [No Varicosities] : no varicosities [No Edema] : there was no peripheral edema [Soft] : abdomen soft [Non Tender] : non-tender [Normal Bowel Sounds] : normal bowel sounds [No CVA Tenderness] : no CVA  tenderness [No Spinal Tenderness] : no spinal tenderness [Normal] : normal gait, coordination grossly intact, no focal deficits and deep tendon reflexes were 2+ and symmetric [de-identified] : Abd distended.

## 2025-01-02 NOTE — REVIEW OF SYSTEMS
[Negative] : Musculoskeletal [Fever] : no fever [Chills] : no chills [Fatigue] : no fatigue [Night Sweats] : no night sweats [Discharge] : no discharge [Pain] : no pain [Vision Problems] : no vision problems [Nasal Discharge] : no nasal discharge [Sore Throat] : no sore throat [Hoarseness] : no hoarseness [Skin Rash] : no skin rash [de-identified] : +HA

## 2025-01-15 NOTE — REVIEW OF SYSTEMS
[Confused or Disoriented] : confusion [Difficulty Walking] : difficulty walking [As Noted in HPI] : as noted in HPI [Eye Pain] : eye pain [Negative] : Integumentary

## 2025-01-15 NOTE — HISTORY OF PRESENT ILLNESS
[FreeTextEntry1] : RONEL VALERA is a 78 year-old Cantonese-speaking male with a PMHx significant for HTN, DM, and SAH 2/2 ruptured Left ICA aneurysm s/p coil embolization, who presents to the office today for postop visit following new stent placement to the previously coiled Left ICA aneurysm   Patient presented to [facility] ED 2/27/24 with worsening headache. He reportedly fell while playing ping pong 4 days prior and had worsening headache since then. He takes ASA 81mg (last dose yesterday) for cardio protection. PCP ordered MRI and MRA Brain, which revealed large Left Supraclinoid ICA aneurysm, so was sent to ED. CT Head/CTA Head/Neck in the ED showed diffuse SAH, IVH, and 2.1cm L Supraclinoid aneurysm. Patient endorsed mild headache only. Denied vision changes, nausea/vomiting, weakness, numbness. He was admitted to the NSICU and on 2/28/24 the aneurysm was treated with coil embolization by Dr. Loera. Patient's hospital course was complicated by CSW and Influenza A. He was stabilized and discharged to [facility] Acute Rehab 2/20/24.  4/11/24 - first postop visit with Dr. Loera. Unable to walk; in wheelchair. Patient complaining of confusion and memory loss. Mentation is improving. He endorses occasional Right-sided headaches 7/10 pain; has chronic blurry vision. Complaining of abd pain and constipation. Plan is to repeat MRA Head in 3 months and return to clinic. Continue working with PT  5/21/24 - f/u appt with Dr. Loera. Feeling much improved/stronger than last visit, still requiring wheelchair. Confusion and memory loss, disoriented to year oriented to self, place, and situation. Peripheral blurry vision in both eyes improved but visual field not quite full. Once patient discharged from rehab, plan for appt with LUKAS Kunz for Angiogram with possible flow diverter stent in August 2024 6/03/24 - patient discharged to home from Acute Rehab. Received 2 additional weeks of physical therapy at home  8/01/24 - f/u appt with LUKAS Kunz. Patient endorses feeling much more energetic and closer to baseline since discharge to home. Still with confusion, memory loss and leg weakness. Left leg feels stronger than Right. Peripheral blurry vision improved. Disoriented to year still, but oriented to self, place, and situation. Patient's balance was impaired on exam, able to slowly ambulate on his own. LUE weaker than other 3 extremities. Plan to continue PT and obtain new MRA Brain in 6 months for aneurysm and MRI to r/o Stroke or new areas of bleed.  11/19/24 - f/u appt with Dr. Loera with new MRI and MRA.  patient states he is feeling good, son states memory and cog issues remain. Strength and sensation intact x4. Still uses rolling walker for ambulation.  1/08/25 - Stent placement to Left ICA aneurysm site with compacted coil material  TODAY patient states he is doing well, groin site c/d/i with no pain or redness. Endorses pain in Left eye intermittently which is chronic. Patient denies headache, cervicalgia, nausea/vomiting, visual disturbance, numbness/tingling, no new extremity weakness, or seizure-like activity. *** MED REC INCOMPLETE AS PATIENT DOESN'T KNOW WHAT MEDS HE IS TAKING ***   Soc Hx: Former smoker (quit 10+ yrs ago), no EtOH, no family history of aneurysms  PCP: Dr. Chun PFEIFFER  [facility]. [address], [place] [address] [phone]  Endo: Dr. Yennifer Mckeon [address] [place], [place] [id number] [phone]

## 2025-01-15 NOTE — REASON FOR VISIT
[Home] : at home, [unfilled] , at the time of the visit. [Medical Office: ([facility])___] : at the medical office located in  [Verbal consent obtained from patient] : the patient, [unfilled] [Language Line ] : provided by Language Line   [de-identified] : stent placement Left ICA aneurysm s/p coil  [de-identified] : 1/08/25 [Interpreters_IDNumber] : [id number] [Interpreters_FullName] : Althea [TWNoteComboBox1] : Jarred

## 2025-01-15 NOTE — HISTORY OF PRESENT ILLNESS
[FreeTextEntry1] : RONEL VALERA is a 78 year-old Cantonese-speaking male with a PMHx significant for HTN, DM, and SAH 2/2 ruptured Left ICA aneurysm s/p coil embolization, who presents to the office today for postop visit following new stent placement to the previously coiled Left ICA aneurysm   Patient presented to [facility] ED 2/27/24 with worsening headache. He reportedly fell while playing ping pong 4 days prior and had worsening headache since then. He takes ASA 81mg (last dose yesterday) for cardio protection. PCP ordered MRI and MRA Brain, which revealed large Left Supraclinoid ICA aneurysm, so was sent to ED. CT Head/CTA Head/Neck in the ED showed diffuse SAH, IVH, and 2.1cm L Supraclinoid aneurysm. Patient endorsed mild headache only. Denied vision changes, nausea/vomiting, weakness, numbness. He was admitted to the NSICU and on 2/28/24 the aneurysm was treated with coil embolization by Dr. Loera. Patient's hospital course was complicated by CSW and Influenza A. He was stabilized and discharged to [facility]hab 2/20/24.  4/11/24 - first postop visit with Dr. Loera. Unable to walk; in wheelchair. Patient complaining of confusion and memory loss. Mentation is improving. He endorses occasional Right-sided headaches 7/10 pain; has chronic blurry vision. Complaining of abd pain and constipation. Plan is to repeat MRA Head in 3 months and return to clinic. Continue working with PT  5/21/24 - f/u appt with Dr. Loera. Feeling much improved/stronger than last visit, still requiring wheelchair. Confusion and memory loss, disoriented to year oriented to self, place, and situation. Peripheral blurry vision in both eyes improved but visual field not quite full. Once patient discharged from rehab, plan for appt with LUKAS Kunz for Angiogram with possible flow diverter stent in August 2024 6/03/24 - patient discharged to home from Acute Rehab. Received 2 additional weeks of physical therapy at home  8/01/24 - f/u appt with LUKAS Kunz. Patient endorses feeling much more energetic and closer to baseline since discharge to home. Still with confusion, memory loss and leg weakness. Left leg feels stronger than Right. Peripheral blurry vision improved. Disoriented to year still, but oriented to self, place, and situation. Patient's balance was impaired on exam, able to slowly ambulate on his own. LUE weaker than other 3 extremities. Plan to continue PT and obtain new MRA Brain in 6 months for aneurysm and MRI to r/o Stroke or new areas of bleed.  11/19/24 - f/u appt with Dr. Loera with new MRI and MRA.  patient states he is feeling good, son states memory and cog issues remain. Strength and sensation intact x4. Still uses rolling walker for ambulation.  1/08/25 - Stent placement to Left ICA aneurysm site with compacted coil material  TODAY patient states he is doing well, groin site c/d/i with no pain or redness. Endorses pain in Left eye intermittently which is chronic. Patient denies headache, cervicalgia, nausea/vomiting, visual disturbance, numbness/tingling, no new extremity weakness, or seizure-like activity. *** MED REC INCOMPLETE AS PATIENT DOESN'T KNOW WHAT MEDS HE IS TAKING ***   Soc Hx: Former smoker (quit 10+ yrs ago), no EtOH, no family history of aneurysms  PCP: Dr. Chun PFEIFFER  [facility]. [address], [place] [address] [phone]  Endo: Dr. Yennifer Mckeon [address] [place], [place] [id number] [phone]

## 2025-01-15 NOTE — REASON FOR VISIT
[Home] : at home, [unfilled] , at the time of the visit. [Medical Office: ([facility])___] : at the medical office located in  [Verbal consent obtained from patient] : the patient, [unfilled] [Language Line ] : provided by Language Line   [de-identified] : stent placement Left ICA aneurysm s/p coil  [de-identified] : 1/08/25 [Interpreters_IDNumber] : [id number] [Interpreters_FullName] : Althea [TWNoteComboBox1] : Jarred

## 2025-01-15 NOTE — REASON FOR VISIT
[Home] : at home, [unfilled] , at the time of the visit. [Medical Office: ([facility])___] : at the medical office located in  [Verbal consent obtained from patient] : the patient, [unfilled] [Language Line ] : provided by Language Line   [de-identified] : stent placement Left ICA aneurysm s/p coil  [de-identified] : 1/08/25 [Interpreters_IDNumber] : [id number] [Interpreters_FullName] : Althea [TWNoteComboBox1] : Jarred

## 2025-01-15 NOTE — ASSESSMENT
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 Loera clinic to review angio results and discuss DAPT plan - see PCP for chronic eye pain   The patient was instructed that if they should immediately call 911 or go to the Emergency Department if they experience symptoms of severe thunderclap headache, syncope, unexplained nausea/vomiting, visual changes, seizure-like activity, new weakness or numbness of extremities.   I reviewed and answered all patient questions. Patient verbalizes agreement and understanding with plan of care. Patient verbalizes agreement and understanding with plan of care.

## 2025-01-15 NOTE — HISTORY OF PRESENT ILLNESS
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 He reportedly fell while playing ping pong 4 days prior and had worsening headache since then. He takes ASA 81mg (last dose yesterday) for cardio protection. PCP ordered MRI and MRA Brain, which revealed large Left Supraclinoid ICA aneurysm, so was sent to ED. CT Head/CTA Head/Neck in the ED showed diffuse SAH, IVH, and 2.1cm L Supraclinoid aneurysm. Patient endorsed mild headache only. Denied vision changes, nausea/vomiting, weakness, numbness. He was admitted to the NSICU and on 2/28/24 the aneurysm was treated with coil embolization by Dr. Lorea. Patient's hospital course was complicated by CSW and Influenza A. He was stabilized and discharged to [facility] Acute Rehab 2/20/24.  4/11/24 - first postop visit with Dr. Loera. Unable to walk; in wheelchair. Patient complaining of confusion and memory loss. Mentation is improving. He endorses occasional Right-sided headaches 7/10 pain; has chronic blurry vision. Complaining of abd pain and constipation. Plan is to repeat MRA Head in 3 months and return to clinic. Continue working with PT  5/21/24 - f/u appt with Dr. Loera. Feeling much improved/stronger than last visit, still requiring wheelchair. Confusion and memory loss, disoriented to year oriented to self, place, and situation. Peripheral blurry vision in both eyes improved but visual field not quite full. Once patient discharged from rehab, plan for appt with LUKAS Kunz for Angiogram with possible flow diverter stent in August 2024 6/03/24 - patient discharged to home from Acute Rehab. Received 2 additional weeks of physical therapy at home  8/01/24 - f/u appt with LUKAS Kunz. Patient endorses feeling much more energetic and closer to baseline since discharge to home. Still with confusion, memory loss and leg weakness. Left leg feels stronger than Right. Peripheral blurry vision improved. Disoriented to year still, but oriented to self, place, and situation. Patient's balance was impaired on exam, able to slowly ambulate on his own. LUE weaker than other 3 extremities. Plan to continue PT and obtain new MRA Brain in 6 months for aneurysm and MRI to r/o Stroke or new areas of bleed.  11/19/24 - f/u appt with Dr. Loera with new MRI and MRA.  patient states he is feeling good, son states memory and cog issues remain. Strength and sensation intact x4. Still uses rolling walker for ambulation.  1/08/25 - Stent placement to Left ICA aneurysm site with compacted coil material  TODAY patient states he is doing well, groin site c/d/i with no pain or redness. Endorses pain in Left eye intermittently which is chronic. Patient denies headache, cervicalgia, nausea/vomiting, visual disturbance, numbness/tingling, no new extremity weakness, or seizure-like activity. *** MED REC INCOMPLETE AS PATIENT DOESN'T KNOW WHAT MEDS HE IS TAKING ***   Soc Hx: Former smoker (quit 10+ yrs ago), no EtOH, no family history of aneurysms  PCP: Dr. Chun PFEIFFER  [facility]. [address], [place] [address] [phone]  Endo: Dr. Yennifer Mckeon [address] [place], [place] [id number] [phone]

## 2025-01-23 NOTE — PHYSICAL EXAM
[General Appearance - Alert] : alert [General Appearance - In No Acute Distress] : in no acute distress [General Appearance - Well Nourished] : well nourished [General Appearance - Well Developed] : well developed [Oriented To Time, Place, And Person] : oriented to person, place, and time [Impaired Insight] : insight and judgment were intact [Motor Tone] : muscle tone was normal in all four extremities [Motor Strength] : muscle strength was normal in all four extremities [Sclera] : the sclera and conjunctiva were normal [Extraocular Movements] : extraocular movements were intact [Full Visual Field] : full visual field [Outer Ear] : the ears and nose were normal in appearance [Neck Appearance] : the appearance of the neck was normal [] : no respiratory distress [Heart Rate And Rhythm] : heart rate was normal and rhythm regular [Abnormal Walk] : normal gait [Skin Color & Pigmentation] : normal skin color and pigmentation

## 2025-01-24 NOTE — REASON FOR VISIT
[Follow-Up: _____] : a [unfilled] follow-up visit [Family Member] : family member [FreeTextEntry1] : LEFT  Supraclinoid ICA aneurysm s/p coil; new stent to residual

## 2025-01-24 NOTE — HISTORY OF PRESENT ILLNESS
[FreeTextEntry1] : RONEL VALERA is a 78 year-old Cantonese-speaking male with a PMHx significant for HTN, DM, and SAH 2/2 ruptured Left ICA aneurysm s/p coil embolization, who presents to the office today for postop visit following new stent placement to the previously coiled Left ICA aneurysm  Patient presented to [facility] ED 2/27/24 with worsening headache. He reportedly fell while playing ping pong 4 days prior and had worsening headache since then. He takes ASA 81mg (last dose yesterday) for cardio protection. PCP ordered MRI and MRA Brain, which revealed large Left Supraclinoid ICA aneurysm, so was sent to ED. CT Head/CTA Head/Neck in the ED showed diffuse SAH, IVH, and 2.1cm L Supraclinoid aneurysm. Patient endorsed mild headache only. Denied vision changes, nausea/vomiting, weakness, numbness. He was admitted to the NSICU and on 2/28/24 the aneurysm was treated with coil embolization by Dr. Loera. Patient's hospital course was complicated by CSW and Influenza A. He was stabilized and discharged to [facility] Acute Rehab 2/20/24.  4/11/24 - first postop visit with Dr. Loera. Unable to walk; in wheelchair. Patient complaining of confusion and memory loss. Mentation is improving. He endorses occasional Right-sided headaches 7/10 pain; has chronic blurry vision. Complaining of abd pain and constipation. Plan is to repeat MRA Head in 3 months and return to clinic. Continue working with PT  5/21/24 - f/u appt with Dr. Loera. Feeling much improved/stronger than last visit, still requiring wheelchair. Confusion and memory loss, disoriented to year oriented to self, place, and situation. Peripheral blurry vision in both eyes improved but visual field not quite full. Once patient discharged from rehab, plan for appt with LUKAS Kunz for Angiogram with possible flow diverter stent in August 2024 6/03/24 - patient discharged to home from Acute Rehab. Received 2 additional weeks of physical therapy at home  8/01/24 - f/u appt with LUKAS Kunz. Patient endorses feeling much more energetic and closer to baseline since discharge to home. Still with confusion, memory loss and leg weakness. Left leg feels stronger than Right. Peripheral blurry vision improved. Disoriented to year still, but oriented to self, place, and situation. Patient's balance was impaired on exam, able to slowly ambulate on his own. LUE weaker than other 3 extremities. Plan to continue PT and obtain new MRA Brain in 6 months for aneurysm and MRI to r/o Stroke or new areas of bleed.  11/19/24 - f/u appt with Dr. Loera with new MRI and MRA. patient states he is feeling good, son states memory and cog issues remain. Strength and sensation intact x4. Still uses rolling walker for ambulation.  1/08/25 - Stent placement to Left ICA aneurysm site with compacted coil material  1/23/25 - patient states he is doing well, groin site c/d/i with no pain or redness. Endorses pain in Left eye intermittently which is chronic. Patient denies headache, cervicalgia, nausea/vomiting, visual disturbance, numbness/tingling, no new extremity weakness, or seizure-like activity.   TODAY patient no longer has headaches, has congestion from cold. Patient denies headache, cervicalgia, nausea/vomiting, visual disturbance, numbness/tingling, extremity weakness, or seizure-like activity. On exam, patient's strength is improved, uses rolling walker for extended distances. Glucose being actively optimized by Endocrinology provider.   Soc Hx: Former smoker (quit 10+ yrs ago), no EtOH, no family history of aneurysms  PCP: Dr. Chun PFEIFFER  [facility]. [address], [place] [id number] [phone]  Endo: Dr. Yennifer Mckeon [address] [place] [address] [phone]

## 2025-01-24 NOTE — HISTORY OF PRESENT ILLNESS
[FreeTextEntry1] : RONEL VALERA is a 78 year-old Cantonese-speaking male with a PMHx significant for HTN, DM, and SAH 2/2 ruptured Left ICA aneurysm s/p coil embolization, who presents to the office today for postop visit following new stent placement to the previously coiled Left ICA aneurysm  Patient presented to [facility] ED 2/27/24 with worsening headache. He reportedly fell while playing ping pong 4 days prior and had worsening headache since then. He takes ASA 81mg (last dose yesterday) for cardio protection. PCP ordered MRI and MRA Brain, which revealed large Left Supraclinoid ICA aneurysm, so was sent to ED. CT Head/CTA Head/Neck in the ED showed diffuse SAH, IVH, and 2.1cm L Supraclinoid aneurysm. Patient endorsed mild headache only. Denied vision changes, nausea/vomiting, weakness, numbness. He was admitted to the NSICU and on 2/28/24 the aneurysm was treated with coil embolization by Dr. Loera. Patient's hospital course was complicated by CSW and Influenza A. He was stabilized and discharged to [facility] Acute Rehab 2/20/24.  4/11/24 - first postop visit with Dr. Loera. Unable to walk; in wheelchair. Patient complaining of confusion and memory loss. Mentation is improving. He endorses occasional Right-sided headaches 7/10 pain; has chronic blurry vision. Complaining of abd pain and constipation. Plan is to repeat MRA Head in 3 months and return to clinic. Continue working with PT  5/21/24 - f/u appt with Dr. Loera. Feeling much improved/stronger than last visit, still requiring wheelchair. Confusion and memory loss, disoriented to year oriented to self, place, and situation. Peripheral blurry vision in both eyes improved but visual field not quite full. Once patient discharged from rehab, plan for appt with LUKAS Kunz for Angiogram with possible flow diverter stent in August 2024 6/03/24 - patient discharged to home from Acute Rehab. Received 2 additional weeks of physical therapy at home  8/01/24 - f/u appt with LUKAS Kunz. Patient endorses feeling much more energetic and closer to baseline since discharge to home. Still with confusion, memory loss and leg weakness. Left leg feels stronger than Right. Peripheral blurry vision improved. Disoriented to year still, but oriented to self, place, and situation. Patient's balance was impaired on exam, able to slowly ambulate on his own. LUE weaker than other 3 extremities. Plan to continue PT and obtain new MRA Brain in 6 months for aneurysm and MRI to r/o Stroke or new areas of bleed.  11/19/24 - f/u appt with Dr. Loera with new MRI and MRA. patient states he is feeling good, son states memory and cog issues remain. Strength and sensation intact x4. Still uses rolling walker for ambulation.  1/08/25 - Stent placement to Left ICA aneurysm site with compacted coil material  1/23/25 - patient states he is doing well, groin site c/d/i with no pain or redness. Endorses pain in Left eye intermittently which is chronic. Patient denies headache, cervicalgia, nausea/vomiting, visual disturbance, numbness/tingling, no new extremity weakness, or seizure-like activity.   TODAY patient no longer has headaches, has congestion from cold. Patient denies headache, cervicalgia, nausea/vomiting, visual disturbance, numbness/tingling, extremity weakness, or seizure-like activity. On exam, patient's strength is improved, uses rolling walker for extended distances. Glucose being actively optimized by Endocrinology provider.   Soc Hx: Former smoker (quit 10+ yrs ago), no EtOH, no family history of aneurysms  PCP: Dr. Chun PFEIFFER  [facility]. [address], [place] [id number] [phone]  Endo: Dr. Yennifer Mckeon [address] [place] [address] [phone]

## 2025-01-24 NOTE — HISTORY OF PRESENT ILLNESS
[FreeTextEntry1] : RONEL VALERA is a 78 year-old Cantonese-speaking male with a PMHx significant for HTN, DM, and SAH 2/2 ruptured Left ICA aneurysm s/p coil embolization, who presents to the office today for postop visit following new stent placement to the previously coiled Left ICA aneurysm  Patient presented to [facility] ED 2/27/24 with worsening headache. He reportedly fell while playing ping pong 4 days prior and had worsening headache since then. He takes ASA 81mg (last dose yesterday) for cardio protection. PCP ordered MRI and MRA Brain, which revealed large Left Supraclinoid ICA aneurysm, so was sent to ED. CT Head/CTA Head/Neck in the ED showed diffuse SAH, IVH, and 2.1cm L Supraclinoid aneurysm. Patient endorsed mild headache only. Denied vision changes, nausea/vomiting, weakness, numbness. He was admitted to the NSICU and on 2/28/24 the aneurysm was treated with coil embolization by Dr. Loera. Patient's hospital course was complicated by CSW and Influenza A. He was stabilized and discharged to [facility] 2/20/24.  4/11/24 - first postop visit with Dr. Loera. Unable to walk; in wheelchair. Patient complaining of confusion and memory loss. Mentation is improving. He endorses occasional Right-sided headaches 7/10 pain; has chronic blurry vision. Complaining of abd pain and constipation. Plan is to repeat MRA Head in 3 months and return to clinic. Continue working with PT  5/21/24 - f/u appt with Dr. Loera. Feeling much improved/stronger than last visit, still requiring wheelchair. Confusion and memory loss, disoriented to year oriented to self, place, and situation. Peripheral blurry vision in both eyes improved but visual field not quite full. Once patient discharged from rehab, plan for appt with LUKAS Kunz for Angiogram with possible flow diverter stent in August 2024 6/03/24 - patient discharged to home from Acute Rehab. Received 2 additional weeks of physical therapy at home  8/01/24 - f/u appt with LUKAS Kunz. Patient endorses feeling much more energetic and closer to baseline since discharge to home. Still with confusion, memory loss and leg weakness. Left leg feels stronger than Right. Peripheral blurry vision improved. Disoriented to year still, but oriented to self, place, and situation. Patient's balance was impaired on exam, able to slowly ambulate on his own. LUE weaker than other 3 extremities. Plan to continue PT and obtain new MRA Brain in 6 months for aneurysm and MRI to r/o Stroke or new areas of bleed.  11/19/24 - f/u appt with Dr. Loera with new MRI and MRA. patient states he is feeling good, son states memory and cog issues remain. Strength and sensation intact x4. Still uses rolling walker for ambulation.  1/08/25 - Stent placement to Left ICA aneurysm site with compacted coil material  1/23/25 - patient states he is doing well, groin site c/d/i with no pain or redness. Endorses pain in Left eye intermittently which is chronic. Patient denies headache, cervicalgia, nausea/vomiting, visual disturbance, numbness/tingling, no new extremity weakness, or seizure-like activity.   TODAY patient no longer has headaches, has congestion from cold. Patient denies headache, cervicalgia, nausea/vomiting, visual disturbance, numbness/tingling, extremity weakness, or seizure-like activity. On exam, patient's strength is improved, uses rolling walker for extended distances. Glucose being actively optimized by Endocrinology provider.   Soc Hx: Former smoker (quit 10+ yrs ago), no EtOH, no family history of aneurysms  PCP: Dr. Chun PFEIFFER  [facility]. [address], [place] [id number] [phone]  Endo: Dr. Yennifer Mckeon [address] [place] [address] [phone]

## 2025-01-24 NOTE — ASSESSMENT
[FreeTextEntry1] : RONEL VALERA is a 78 year-old Chinese-speaking male with a PMHx significant for HTN, DM, and SAH 2/2 ruptured Left ICA aneurysm s/p coil embolization, who presents to the office today for postop visit following new stent placement to the previously coiled Left ICA aneurysm    PLAN: - cont. 90mg Brilinta BID - repeat p2y12 in April 2025 - MRA Brain June 2025 - see PCP for chronic eye pain   The patient was instructed that if they should immediately call 911 or go to the Emergency Department if they experience symptoms of severe thunderclap headache, syncope, unexplained nausea/vomiting, visual changes, seizure-like activity, new weakness or numbness of extremities.   I reviewed and answered all patient questions. Patient verbalizes agreement and understanding with plan of care. Patient verbalizes agreement and understanding with plan of care.  I, Dr. Loera, personally performed the evaluation and management (E/M) services for this established patient who presents today with (a) new problem(s)/exacerbation of (an) existing condition(s). That E/M includes conducting the examination, assessing all new/exacerbated conditions, and establishing a new plan of care. Today, SALVADOR Song, was here to observe my evaluation and management services for this new problem/exacerbated condition to be followed going forward.

## 2025-06-24 NOTE — PHYSICAL EXAM
[General Appearance - Alert] : alert [General Appearance - In No Acute Distress] : in no acute distress [Sclera] : the sclera and conjunctiva were normal [Outer Ear] : the ears and nose were normal in appearance [Neck Appearance] : the appearance of the neck was normal [] : no respiratory distress [Skin Color & Pigmentation] : normal skin color and pigmentation

## 2025-06-24 NOTE — ASSESSMENT
[FreeTextEntry1] : RONEL VALERA is a 79 year-old Chinese-speaking male with a PMHx significant for HTN, DM, and SAH 2/2 ruptured Left ICA aneurysm s/p coil embolization, and now most recently s/p new stent placement to the previously coiled Left ICA aneurysm 1/8/25. Returned to the office today for review of recent MRA done 6/19/25 which showed status post flow diverter stent in the left ICA. Decrease flow related signal noted in the aneurysm sac but flow still noted. Discussed since patient has been on brilinta for 6 months and needs dental work, ok to now discontinue brilinta and only continue aspirin. Will do MRA in 1 year to evaluate aneurysm. Will consider angiogram at that time in particular if there is still residual aneurysm, as patient may need additional stent.   PLAN: -patient needs dental work for loose tooth, discontinue brilinta now and only continue ASA  -if necessary dentist can hold ASA for a few days and then patient should continue to take after procedure -MRA in 1 year  -return to Dr. Loera to review   The patient was instructed that if they should immediately call 911 or go to the Emergency Department if they experience symptoms of severe thunderclap headache, syncope, unexplained nausea/vomiting, visual changes, seizure-like activity, new weakness or numbness of extremities.  I reviewed and answered all patient questions. Patient verbalizes agreement and understanding with plan of care. Patient verbalizes agreement and understanding with plan of care.  I, Dr. Loera, personally performed the evaluation and management (E/M) services for this established patient who presents today with (a) new problem(s)/exacerbation of (an) existing condition(s). That E/M includes conducting the examination, assessing all new/exacerbated conditions, and establishing a new plan of care. Today, JR Loving was here to observe my evaluation and management services for this new problem/exacerbated condition to be followed going forward.

## 2025-06-24 NOTE — DATA REVIEWED
[de-identified] : ACC: [id number]     EXAM:  MR ANGIO BRAIN   ORDERED BY: DHRUV ROSARIO  PROCEDURE DATE:  06/19/2025    INTERPRETATION:  PROCEDURE: MRA brain without contrast.  INDICATION: Follow-up stent embolization of recurrent supraclinoid left ICA aneurysm.  TECHNIQUE: The MRA of the [place] was performed utilizing 3D TOF technique. Rotational MIP series are provided. Limited imaging of the brain includes axial T2-FLAIR and diffusion imaging.  COMPARISON: MRA brain 11/7/2024. Cerebral angiogram 1/8/2025  FINDINGS: Coil embolization mass is again noted in the suprasellar cistern. Flow diverter stent is noted in the cavernous and supraclinoid left ICA with associated artifact. There is flow-related signal in the ICA. Evaluation for stenosis is degraded due to artifact from the stent. There is signal noted in the coil mass in the aneurysm sac on the left which may represent flow-related signal in the aneurysm sac. This is decreased from prior exam measuring 9 5 5 x 10 mm (series 5 image 118 and series 1051 image 33). Previously measured 11 x 8 x 12 mm. There is flow-related signal in the proximal left SARAVANAN and MCA without significant stenosis. There is flow-related signal in the right ICA and proximal right SARAVANAN and MCA without significant stenosis.  There is flow-related signal in the distal bilateral vertebral arteries, basilar artery and bilateral posterior cerebral arteries. There is no significant stenosis.  There is no acute infarction on diffusion-weighted imaging. This is stable ventricular caliber. Right maxillary sinus polyp/retention cyst is noted.  IMPRESSION: Compared to prior MRI brain 11/7/2024, status post post flow diverter stent in the left ICA. Decrease flow related signal noted in the aneurysm sac as above.  --- End of Report ---      YAMEL GUALLPA MD; Attending Radiologist This document has been electronically signed. Jun 23 2025 12:40PM

## 2025-06-24 NOTE — DATA REVIEWED
[de-identified] : ACC: [id number]     EXAM:  MR ANGIO BRAIN   ORDERED BY: DHRUV ROSARIO  PROCEDURE DATE:  06/19/2025    INTERPRETATION:  PROCEDURE: MRA brain without contrast.  INDICATION: Follow-up stent embolization of recurrent supraclinoid left ICA aneurysm.  TECHNIQUE: The MRA of the [place] was performed utilizing 3D TOF technique. Rotational MIP series are provided. Limited imaging of the brain includes axial T2-FLAIR and diffusion imaging.  COMPARISON: MRA brain 11/7/2024. Cerebral angiogram 1/8/2025  FINDINGS: Coil embolization mass is again noted in the suprasellar cistern. Flow diverter stent is noted in the cavernous and supraclinoid left ICA with associated artifact. There is flow-related signal in the ICA. Evaluation for stenosis is degraded due to artifact from the stent. There is signal noted in the coil mass in the aneurysm sac on the left which may represent flow-related signal in the aneurysm sac. This is decreased from prior exam measuring 9 5 5 x 10 mm (series 5 image 118 and series 1051 image 33). Previously measured 11 x 8 x 12 mm. There is flow-related signal in the proximal left SARAVANAN and MCA without significant stenosis. There is flow-related signal in the right ICA and proximal right SARAVANAN and MCA without significant stenosis.  There is flow-related signal in the distal bilateral vertebral arteries, basilar artery and bilateral posterior cerebral arteries. There is no significant stenosis.  There is no acute infarction on diffusion-weighted imaging. This is stable ventricular caliber. Right maxillary sinus polyp/retention cyst is noted.  IMPRESSION: Compared to prior MRI brain 11/7/2024, status post post flow diverter stent in the left ICA. Decrease flow related signal noted in the aneurysm sac as above.  --- End of Report ---      YAMEL GUALLPA MD; Attending Radiologist This document has been electronically signed. Jun 23 2025 12:40PM

## 2025-06-24 NOTE — DATA REVIEWED
[de-identified] : ACC: [id number]     EXAM:  MR ANGIO BRAIN   ORDERED BY: DHRUV ROSARIO  PROCEDURE DATE:  06/19/2025    INTERPRETATION:  PROCEDURE: MRA brain without contrast.  INDICATION: Follow-up stent embolization of recurrent supraclinoid left ICA aneurysm.  TECHNIQUE: The MRA of the [place] was performed utilizing 3D TOF technique. Rotational MIP series are provided. Limited imaging of the brain includes axial T2-FLAIR and diffusion imaging.  COMPARISON: MRA brain 11/7/2024. Cerebral angiogram 1/8/2025  FINDINGS: Coil embolization mass is again noted in the suprasellar cistern. Flow diverter stent is noted in the cavernous and supraclinoid left ICA with associated artifact. There is flow-related signal in the ICA. Evaluation for stenosis is degraded due to artifact from the stent. There is signal noted in the coil mass in the aneurysm sac on the left which may represent flow-related signal in the aneurysm sac. This is decreased from prior exam measuring 9 5 5 x 10 mm (series 5 image 118 and series 1051 image 33). Previously measured 11 x 8 x 12 mm. There is flow-related signal in the proximal left SARAVA[place]N and MCA without significant stenosis. There is flow-related signal in the right ICA and proximal right SARAVANAN and MCA without significant stenosis.  There is flow-related signal in the distal bilateral vertebral arteries, basilar artery and bilateral posterior cerebral arteries. There is no significant stenosis.  There is no acute infarction on diffusion-weighted imaging. This is stable ventricular caliber. Right maxillary sinus polyp/retention cyst is noted.  IMPRESSION: Compared to prior MRI brain 11/7/2024, status post post flow diverter stent in the left ICA. Decrease flow related signal noted in the aneurysm sac as above.  --- End of Report ---      YAMEL GUALLPA MD; Attending Radiologist This document has been electronically signed. Jun 23 2025 12:40PM

## 2025-06-24 NOTE — REASON FOR VISIT
[FreeTextEntry1] : hx of Stent placement to Left ICA aneurysm site with compacted coil material 1/8/25. Follow up and MRA review.

## 2025-06-24 NOTE — HISTORY OF PRESENT ILLNESS
[FreeTextEntry1] : RONEL VALERA is a 79 year-old Cantonese-speaking male with a PMHx significant for HTN, DM, and SAH 2/2 ruptured Left ICA aneurysm s/p coil embolization; and now most recently s/p new stent placement to the previously coiled Left ICA aneurysm 1/8/25.   __________ Patient presented to [facility] ED 2/27/24 with worsening headache. He reportedly fell while playing [email or web address] pong 4 days prior and had worsening headache since then. He takes ASA 81mg (last dose yesterday) for cardio protection. PCP ordered MRI and MRA Brain, which revealed large Left Supraclinoid ICA aneurysm, so was sent to ED. CT Head/CTA Head/Neck in the ED showed diffuse SAH, IVH, and 2.1cm L Supraclinoid aneurysm. Patient endorsed mild headache only. Denied vision changes, nausea/vomiting, weakness, numbness. He was admitted to the NSICU and on 2/28/24 the aneurysm was treated with coil embolization by Dr. Loera. Patient's hospital course was complicated by CSW and Influenza A. He was stabilized and discharged to [facility] 2/20/24.  4/11/24 - first postop visit with Dr. Loera. Unable to walk; in wheelchair. Patient complaining of confusion and memory loss. Mentation is improving. He endorses occasional Right-sided headaches 7/10 pain; has chronic blurry vision. Complaining of abd pain and constipation. Plan is to repeat MRA Head in 3 months and return to clinic. Continue working with PT  5/21/24 - f/u appt with Dr. Loera. Feeling much improved/stronger than last visit, still requiring wheelchair. Confusion and memory loss, disoriented to year oriented to self, place, and situation. Peripheral blurry vision in both eyes improved but visual field not quite full. Once patient discharged from rehab, plan for appt with LUKAS Kunz for Angiogram with possible flow diverter stent in August 2024 6/03/24 - patient discharged to home from Acute Rehab. Received 2 additional weeks of physical therapy at home  8/01/24 - f/u appt with LUKAS Kunz. Patient endorses feeling much more energetic and closer to baseline since discharge to home. Still with confusion, memory loss and leg weakness. Left leg feels stronger than Right. Peripheral blurry vision improved. Disoriented to year still, but oriented to self, place, and situation. Patient's balance was impaired on exam, able to slowly ambulate on his own. LUE weaker than other 3 extremities. Plan to continue PT and obtain new MRA Brain in 6 months for aneurysm and MRI to r/o Stroke or new areas of bleed.  11/19/24 - f/u appt with Dr. Loera with new MRI and MRA. patient states he is feeling good, son states memory and cog issues remain. Strength and sensation intact x4. Still uses rolling walker for ambulation.  1/08/25 - Stent placement to Left ICA aneurysm site with compacted coil material  1/23/25 post stent - patient states he is doing well, groin site c/d/i with no pain or redness. Endorses pain in Left eye intermittently which is chronic. Patient denies headache, cervicalgia, nausea/vomiting, visual disturbance, numbness/tingling, no new extremity weakness, or seizure-like activity.  4/10/25 Repeat p2y12: 7 --> Brilinta dose decreased to 60mg 1x daily  6/24/25 Returns TODAY for follow- up and MRA review.  6/19 MRA- status post post flow diverter stent in the left ICA. Decrease flow related signal noted in the aneurysm sac. Reports chronic L eye strain. Has some pain on L neck.   Soc Hx: Former smoker (quit 10+ yrs ago), no EtOH, no family history of aneurysms  PCP: Dr. Chun PFEIFFER  [facility]. [address], [place] [id number] [phone]  Endo: Dr. Yennifer Mckeon [address] [place] [address] [phone]

## 2025-06-24 NOTE — HISTORY OF PRESENT ILLNESS
[FreeTextEntry1] : RONEL VALERA is a 79 year-old Cantonese-speaking male with a PMHx significant for HTN, DM, and SAH 2/2 ruptured Left ICA aneurysm s/p coil embolization; and now most recently s/p new stent placement to the previously coiled Left ICA aneurysm 1/8/25.   __________ Patient presented to [facility] ED 2/27/24 with worsening headache. He reportedly fell while playing [employer] pong 4 days prior and had worsening headache since then. He takes ASA 81mg (last dose yesterday) for cardio protection. PCP ordered MRI and MRA Brain, which revealed large Left Supraclinoid ICA aneurysm, so was sent to ED. CT Head/CTA Head/Neck in the ED showed diffuse SAH, IVH, and 2.1cm L Supraclinoid aneurysm. Patient endorsed mild headache only. Denied vision changes, nausea/vomiting, weakness, numbness. He was admitted to the NSICU and on 2/28/24 the aneurysm was treated with coil embolization by Dr. Loera. Patient's hospital course was complicated by CSW and Influenza A. He was stabilized and discharged to [facility] 2/20/24.  4/11/24 - first postop visit with Dr. Loera. Unable to walk; in wheelchair. Patient complaining of confusion and memory loss. Mentation is improving. He endorses occasional Right-sided headaches 7/10 pain; has chronic blurry vision. Complaining of abd pain and constipation. Plan is to repeat MRA Head in 3 months and return to clinic. Continue working with PT  5/21/24 - f/u appt with Dr. Loera. Feeling much improved/stronger than last visit, still requiring wheelchair. Confusion and memory loss, disoriented to year oriented to self, place, and situation. Peripheral blurry vision in both eyes improved but visual field not quite full. Once patient discharged from rehab, plan for appt with LUKAS Kunz for Angiogram with possible flow diverter stent in August 2024 6/03/24 - patient discharged to home from Acute Rehab. Received 2 additional weeks of physical therapy at home  8/01/24 - f/u appt with LUKAS Kunz. Patient endorses feeling much more energetic and closer to baseline since discharge to home. Still with confusion, memory loss and leg weakness. Left leg feels stronger than Right. Peripheral blurry vision improved. Disoriented to year still, but oriented to self, place, and situation. Patient's balance was impaired on exam, able to slowly ambulate on his own. LUE weaker than other 3 extremities. Plan to continue PT and obtain new MRA Brain in 6 months for aneurysm and MRI to r/o Stroke or new areas of bleed.  11/19/24 - f/u appt with Dr. Loera with new MRI and MRA. patient states he is feeling good, son states memory and cog issues remain. Strength and sensation intact x4. Still uses rolling walker for ambulation.  1/08/25 - Stent placement to Left ICA aneurysm site with compacted coil material  1/23/25 post stent - patient states he is doing well, groin site c/d/i with no pain or redness. Endorses pain in Left eye intermittently which is chronic. Patient denies headache, cervicalgia, nausea/vomiting, visual disturbance, numbness/tingling, no new extremity weakness, or seizure-like activity.  4/10/25 Repeat p2y12: 7 --> Brilinta dose decreased to 60mg 1x daily  6/24/25 Returns TODAY for follow- up and MRA review.  6/19 MRA- status post post flow diverter stent in the left ICA. Decrease flow related signal noted in the aneurysm sac. Reports chronic L eye strain. Has some pain on L neck.   Soc Hx: Former smoker (quit 10+ yrs ago), no EtOH, no family history of aneurysms  PCP: Dr. Chun PFEIFFER  [facility]. [address], [place] [id number] [phone]  Endo: Dr. Yennifer Mckeon [address] [place] [address] [phone]

## 2025-06-24 NOTE — HISTORY OF PRESENT ILLNESS
[FreeTextEntry1] : RONEL VALERA is a 79 year-old Cantonese-speaking male with a PMHx significant for HTN, DM, and SAH 2/2 ruptured Left ICA aneurysm s/p coil embolization; and now most recently s/p new stent placement to the previously coiled Left ICA aneurysm 1/8/25.   __________ Patient presented to [facility] ED 2/27/24 with worsening headache. He reportedly fell while playing pinAvitide pong 4 days prior and had worsening headache since then. He takes ASA 81mg (last dose yesterday) for cardio protection. PCP ordered MRI and MRA Brain, which revealed large Left Supraclinoid ICA aneurysm, so was sent to ED. CT Head/CTA Head/Neck in the ED showed diffuse SAH, IVH, and 2.1cm L Supraclinoid aneurysm. Patient endorsed mild headache only. Denied vision changes, nausea/vomiting, weakness, numbness. He was admitted to the NSICU and on 2/28/24 the aneurysm was treated with coil embolization by Dr. Loera. Patient's hospital course was complicated by CSW and Influenza A. He was stabilized and discharged to [facility] 2/20/24.  4/11/24 - first postop visit with Dr. Loera. Unable to walk; in wheelchair. Patient complaining of confusion and memory loss. Mentation is improving. He endorses occasional Right-sided headaches 7/10 pain; has chronic blurry vision. Complaining of abd pain and constipation. Plan is to repeat MRA Head in 3 months and return to clinic. Continue working with PT  5/21/24 - f/u appt with Dr. Loera. Feeling much improved/stronger than last visit, still requiring wheelchair. Confusion and memory loss, disoriented to year oriented to self, place, and situation. Peripheral blurry vision in both eyes improved but visual field not quite full. Once patient discharged from rehab, plan for appt with LUKAS Kunz for Angiogram with possible flow diverter stent in August 2024 6/03/24 - patient discharged to home from Acute Rehab. Received 2 additional weeks of physical therapy at home  8/01/24 - f/u appt with LUKAS Kunz. Patient endorses feeling much more energetic and closer to baseline since discharge to home. Still with confusion, memory loss and leg weakness. Left leg feels stronger than Right. Peripheral blurry vision improved. Disoriented to year still, but oriented to self, place, and situation. Patient's balance was impaired on exam, able to slowly ambulate on his own. LUE weaker than other 3 extremities. Plan to continue PT and obtain new MRA Brain in 6 months for aneurysm and MRI to r/o Stroke or new areas of bleed.  11/19/24 - f/u appt with Dr. Loera with new MRI and MRA. patient states he is feeling good, son states memory and cog issues remain. Strength and sensation intact x4. Still uses rolling walker for ambulation.  1/08/25 - Stent placement to Left ICA aneurysm site with compacted coil material  1/23/25 post stent - patient states he is doing well, groin site c/d/i with no pain or redness. Endorses pain in Left eye intermittently which is chronic. Patient denies headache, cervicalgia, nausea/vomiting, visual disturbance, numbness/tingling, no new extremity weakness, or seizure-like activity.  4/10/25 Repeat p2y12: 7 --> Brilinta dose decreased to 60mg 1x daily  6/24/25 Returns TODAY for follow- up and MRA review.  6/19 MRA- status post post flow diverter stent in the left ICA. Decrease flow related signal noted in the aneurysm sac. Reports chronic L eye strain. Has some pain on L neck.   Soc Hx: Former smoker (quit 10+ yrs ago), no EtOH, no family history of aneurysms  PCP: Dr. Chun PFEIFFER  [facility]. [id number], [place] [id number] [phone]  Endo: Dr. Yennifer Mckeon [address] [place] [address] [phone]